# Patient Record
Sex: FEMALE | Race: ASIAN | NOT HISPANIC OR LATINO | Employment: FULL TIME | URBAN - METROPOLITAN AREA
[De-identification: names, ages, dates, MRNs, and addresses within clinical notes are randomized per-mention and may not be internally consistent; named-entity substitution may affect disease eponyms.]

---

## 2017-03-04 ASSESSMENT — MIFFLIN-ST. JEOR: SCORE: 1187.02

## 2017-03-05 ENCOUNTER — SURGERY - HEALTHEAST (OUTPATIENT)
Dept: SURGERY | Facility: HOSPITAL | Age: 42
End: 2017-03-05

## 2017-03-05 ENCOUNTER — ANESTHESIA - HEALTHEAST (OUTPATIENT)
Dept: SURGERY | Facility: HOSPITAL | Age: 42
End: 2017-03-05

## 2017-03-07 ASSESSMENT — MIFFLIN-ST. JEOR: SCORE: 1305.86

## 2017-03-09 ASSESSMENT — MIFFLIN-ST. JEOR: SCORE: 1312.67

## 2017-03-10 ASSESSMENT — MIFFLIN-ST. JEOR: SCORE: 1282.28

## 2017-03-11 ASSESSMENT — MIFFLIN-ST. JEOR: SCORE: 1267.31

## 2017-03-13 ENCOUNTER — HOME CARE/HOSPICE - HEALTHEAST (OUTPATIENT)
Dept: HOME HEALTH SERVICES | Facility: HOME HEALTH | Age: 42
End: 2017-03-13

## 2017-03-13 ASSESSMENT — MIFFLIN-ST. JEOR
SCORE: 1234.65
SCORE: 1245.99

## 2017-03-17 ENCOUNTER — HOME CARE/HOSPICE - HEALTHEAST (OUTPATIENT)
Dept: HOME HEALTH SERVICES | Facility: HOME HEALTH | Age: 42
End: 2017-03-17

## 2017-03-18 ENCOUNTER — HOME CARE/HOSPICE - HEALTHEAST (OUTPATIENT)
Dept: HOME HEALTH SERVICES | Facility: HOME HEALTH | Age: 42
End: 2017-03-18

## 2017-03-19 ENCOUNTER — HOME CARE/HOSPICE - HEALTHEAST (OUTPATIENT)
Dept: HOME HEALTH SERVICES | Facility: HOME HEALTH | Age: 42
End: 2017-03-19

## 2017-03-21 ENCOUNTER — HOME CARE/HOSPICE - HEALTHEAST (OUTPATIENT)
Dept: HOME HEALTH SERVICES | Facility: HOME HEALTH | Age: 42
End: 2017-03-21

## 2017-03-22 ENCOUNTER — HOSPITAL ENCOUNTER (OUTPATIENT)
Dept: INTERVENTIONAL RADIOLOGY/VASCULAR | Facility: HOSPITAL | Age: 42
Discharge: HOME | End: 2017-03-22
Attending: RADIOLOGY

## 2017-03-22 ENCOUNTER — HOSPITAL ENCOUNTER (OUTPATIENT)
Dept: CT IMAGING | Facility: HOSPITAL | Age: 42
Discharge: HOME | End: 2017-03-22

## 2017-03-22 DIAGNOSIS — L02.91 ABSCESS: ICD-10-CM

## 2017-03-22 ASSESSMENT — MIFFLIN-ST. JEOR: SCORE: 1258.24

## 2017-03-23 ENCOUNTER — COMMUNICATION - HEALTHEAST (OUTPATIENT)
Dept: INTERVENTIONAL RADIOLOGY/VASCULAR | Facility: HOSPITAL | Age: 42
End: 2017-03-23

## 2017-03-23 ENCOUNTER — HOME CARE/HOSPICE - HEALTHEAST (OUTPATIENT)
Dept: HOME HEALTH SERVICES | Facility: HOME HEALTH | Age: 42
End: 2017-03-23

## 2017-03-24 ENCOUNTER — COMMUNICATION - HEALTHEAST (OUTPATIENT)
Dept: SURGERY | Facility: CLINIC | Age: 42
End: 2017-03-24

## 2017-03-24 ENCOUNTER — OFFICE VISIT - HEALTHEAST (OUTPATIENT)
Dept: SURGERY | Facility: CLINIC | Age: 42
End: 2017-03-24

## 2017-03-24 DIAGNOSIS — Z48.89 POSTOPERATIVE VISIT: ICD-10-CM

## 2017-03-25 ENCOUNTER — HOME CARE/HOSPICE - HEALTHEAST (OUTPATIENT)
Dept: HOME HEALTH SERVICES | Facility: HOME HEALTH | Age: 42
End: 2017-03-25

## 2017-03-26 ENCOUNTER — HOME CARE/HOSPICE - HEALTHEAST (OUTPATIENT)
Dept: HOME HEALTH SERVICES | Facility: HOME HEALTH | Age: 42
End: 2017-03-26

## 2017-03-28 ENCOUNTER — HOME CARE/HOSPICE - HEALTHEAST (OUTPATIENT)
Dept: HOME HEALTH SERVICES | Facility: HOME HEALTH | Age: 42
End: 2017-03-28

## 2017-03-29 ENCOUNTER — HOME CARE/HOSPICE - HEALTHEAST (OUTPATIENT)
Dept: HOME HEALTH SERVICES | Facility: HOME HEALTH | Age: 42
End: 2017-03-29

## 2017-03-31 ENCOUNTER — HOSPITAL ENCOUNTER (OUTPATIENT)
Dept: CT IMAGING | Facility: HOSPITAL | Age: 42
Discharge: HOME | End: 2017-03-31
Attending: NURSE PRACTITIONER

## 2017-03-31 ENCOUNTER — OFFICE VISIT - HEALTHEAST (OUTPATIENT)
Dept: SURGERY | Facility: CLINIC | Age: 42
End: 2017-03-31

## 2017-03-31 ENCOUNTER — HOSPITAL ENCOUNTER (OUTPATIENT)
Dept: INTERVENTIONAL RADIOLOGY/VASCULAR | Facility: HOSPITAL | Age: 42
Discharge: HOME | End: 2017-03-31
Attending: NURSE PRACTITIONER

## 2017-03-31 ENCOUNTER — COMMUNICATION - HEALTHEAST (OUTPATIENT)
Dept: SURGERY | Facility: CLINIC | Age: 42
End: 2017-03-31

## 2017-03-31 DIAGNOSIS — L02.91 ABSCESS: ICD-10-CM

## 2017-03-31 DIAGNOSIS — Z48.89 POSTOPERATIVE VISIT: ICD-10-CM

## 2017-03-31 ASSESSMENT — MIFFLIN-ST. JEOR: SCORE: 1178.86

## 2017-04-03 ENCOUNTER — COMMUNICATION - HEALTHEAST (OUTPATIENT)
Dept: INTERVENTIONAL RADIOLOGY/VASCULAR | Facility: HOSPITAL | Age: 42
End: 2017-04-03

## 2017-04-03 ENCOUNTER — HOME CARE/HOSPICE - HEALTHEAST (OUTPATIENT)
Dept: HOME HEALTH SERVICES | Facility: HOME HEALTH | Age: 42
End: 2017-04-03

## 2017-04-04 ENCOUNTER — HOME CARE/HOSPICE - HEALTHEAST (OUTPATIENT)
Dept: HOME HEALTH SERVICES | Facility: HOME HEALTH | Age: 42
End: 2017-04-04

## 2017-08-18 RX ORDER — FERROUS SULFATE 325(65) MG
325 TABLET ORAL
COMMUNITY

## 2017-08-25 ENCOUNTER — ANESTHESIA EVENT (OUTPATIENT)
Dept: SURGERY | Facility: CLINIC | Age: 42
End: 2017-08-25
Payer: COMMERCIAL

## 2017-08-25 ENCOUNTER — ANESTHESIA (OUTPATIENT)
Dept: SURGERY | Facility: CLINIC | Age: 42
End: 2017-08-25
Payer: COMMERCIAL

## 2017-08-25 ENCOUNTER — HOSPITAL ENCOUNTER (OUTPATIENT)
Dept: ULTRASOUND IMAGING | Facility: CLINIC | Age: 42
End: 2017-08-25
Attending: OBSTETRICS & GYNECOLOGY | Admitting: OBSTETRICS & GYNECOLOGY
Payer: COMMERCIAL

## 2017-08-25 ENCOUNTER — HOSPITAL ENCOUNTER (OUTPATIENT)
Facility: CLINIC | Age: 42
Discharge: HOME | End: 2017-08-25
Attending: OBSTETRICS & GYNECOLOGY | Admitting: OBSTETRICS & GYNECOLOGY
Payer: COMMERCIAL

## 2017-08-25 VITALS
HEIGHT: 60 IN | BODY MASS INDEX: 27.29 KG/M2 | HEART RATE: 86 BPM | WEIGHT: 139 LBS | OXYGEN SATURATION: 99 % | RESPIRATION RATE: 18 BRPM | TEMPERATURE: 97.4 F | SYSTOLIC BLOOD PRESSURE: 162 MMHG | DIASTOLIC BLOOD PRESSURE: 94 MMHG

## 2017-08-25 DIAGNOSIS — Z98.890 S/P D&C (STATUS POST DILATION AND CURETTAGE): Primary | ICD-10-CM

## 2017-08-25 DIAGNOSIS — N92.0 MENORRHAGIA WITH REGULAR CYCLE: ICD-10-CM

## 2017-08-25 LAB
CREAT SERPL-MCNC: 0.51 MG/DL (ref 0.52–1.04)
GFR SERPL CREATININE-BSD FRML MDRD: >90 ML/MIN/1.7M2
GLUCOSE BLDC GLUCOMTR-MCNC: 250 MG/DL (ref 70–99)
GLUCOSE BLDC GLUCOMTR-MCNC: 264 MG/DL (ref 70–99)
HCG UR QL: NEGATIVE
POTASSIUM SERPL-SCNC: 4.3 MMOL/L (ref 3.4–5.3)

## 2017-08-25 PROCEDURE — 25000128 H RX IP 250 OP 636: Performed by: OBSTETRICS & GYNECOLOGY

## 2017-08-25 PROCEDURE — 36415 COLL VENOUS BLD VENIPUNCTURE: CPT | Performed by: ANESTHESIOLOGY

## 2017-08-25 PROCEDURE — 40000864 US INTRAOPERATIVE

## 2017-08-25 PROCEDURE — 40000306 ZZH STATISTIC PRE PROC ASSESS II: Performed by: OBSTETRICS & GYNECOLOGY

## 2017-08-25 PROCEDURE — 37000009 ZZH ANESTHESIA TECHNICAL FEE, EACH ADDTL 15 MIN: Performed by: OBSTETRICS & GYNECOLOGY

## 2017-08-25 PROCEDURE — 88305 TISSUE EXAM BY PATHOLOGIST: CPT | Mod: 26 | Performed by: OBSTETRICS & GYNECOLOGY

## 2017-08-25 PROCEDURE — 71000014 ZZH RECOVERY PHASE 1 LEVEL 2 FIRST HR: Performed by: OBSTETRICS & GYNECOLOGY

## 2017-08-25 PROCEDURE — 27210794 ZZH OR GENERAL SUPPLY STERILE: Performed by: OBSTETRICS & GYNECOLOGY

## 2017-08-25 PROCEDURE — 36000056 ZZH SURGERY LEVEL 3 1ST 30 MIN: Performed by: OBSTETRICS & GYNECOLOGY

## 2017-08-25 PROCEDURE — 25000128 H RX IP 250 OP 636: Performed by: ANESTHESIOLOGY

## 2017-08-25 PROCEDURE — 82962 GLUCOSE BLOOD TEST: CPT

## 2017-08-25 PROCEDURE — 25000566 ZZH SEVOFLURANE, EA 15 MIN: Performed by: OBSTETRICS & GYNECOLOGY

## 2017-08-25 PROCEDURE — 81025 URINE PREGNANCY TEST: CPT | Performed by: ANESTHESIOLOGY

## 2017-08-25 PROCEDURE — 84132 ASSAY OF SERUM POTASSIUM: CPT | Performed by: ANESTHESIOLOGY

## 2017-08-25 PROCEDURE — 82565 ASSAY OF CREATININE: CPT | Performed by: ANESTHESIOLOGY

## 2017-08-25 PROCEDURE — 25000132 ZZH RX MED GY IP 250 OP 250 PS 637: Performed by: OBSTETRICS & GYNECOLOGY

## 2017-08-25 PROCEDURE — 37000008 ZZH ANESTHESIA TECHNICAL FEE, 1ST 30 MIN: Performed by: OBSTETRICS & GYNECOLOGY

## 2017-08-25 PROCEDURE — 71000027 ZZH RECOVERY PHASE 2 EACH 15 MINS: Performed by: OBSTETRICS & GYNECOLOGY

## 2017-08-25 PROCEDURE — 25000125 ZZHC RX 250: Performed by: OBSTETRICS & GYNECOLOGY

## 2017-08-25 PROCEDURE — 88305 TISSUE EXAM BY PATHOLOGIST: CPT | Performed by: OBSTETRICS & GYNECOLOGY

## 2017-08-25 PROCEDURE — 25000125 ZZHC RX 250: Performed by: NURSE ANESTHETIST, CERTIFIED REGISTERED

## 2017-08-25 PROCEDURE — 25000128 H RX IP 250 OP 636: Performed by: NURSE ANESTHETIST, CERTIFIED REGISTERED

## 2017-08-25 RX ORDER — SODIUM CHLORIDE, SODIUM LACTATE, POTASSIUM CHLORIDE, CALCIUM CHLORIDE 600; 310; 30; 20 MG/100ML; MG/100ML; MG/100ML; MG/100ML
INJECTION, SOLUTION INTRAVENOUS CONTINUOUS
Status: DISCONTINUED | OUTPATIENT
Start: 2017-08-25 | End: 2017-08-25 | Stop reason: HOSPADM

## 2017-08-25 RX ORDER — PROPOFOL 10 MG/ML
INJECTION, EMULSION INTRAVENOUS PRN
Status: DISCONTINUED | OUTPATIENT
Start: 2017-08-25 | End: 2017-08-25

## 2017-08-25 RX ORDER — FENTANYL CITRATE 50 UG/ML
INJECTION, SOLUTION INTRAMUSCULAR; INTRAVENOUS PRN
Status: DISCONTINUED | OUTPATIENT
Start: 2017-08-25 | End: 2017-08-25

## 2017-08-25 RX ORDER — MEPERIDINE HYDROCHLORIDE 25 MG/ML
12.5 INJECTION INTRAMUSCULAR; INTRAVENOUS; SUBCUTANEOUS
Status: DISCONTINUED | OUTPATIENT
Start: 2017-08-25 | End: 2017-08-25 | Stop reason: HOSPADM

## 2017-08-25 RX ORDER — GLYCOPYRROLATE 0.2 MG/ML
INJECTION, SOLUTION INTRAMUSCULAR; INTRAVENOUS PRN
Status: DISCONTINUED | OUTPATIENT
Start: 2017-08-25 | End: 2017-08-25

## 2017-08-25 RX ORDER — LIDOCAINE 40 MG/G
CREAM TOPICAL
Status: DISCONTINUED | OUTPATIENT
Start: 2017-08-25 | End: 2017-08-25 | Stop reason: HOSPADM

## 2017-08-25 RX ORDER — OXYCODONE HCL 5 MG/5 ML
5 SOLUTION, ORAL ORAL EVERY 4 HOURS PRN
Status: DISCONTINUED | OUTPATIENT
Start: 2017-08-25 | End: 2017-08-25 | Stop reason: HOSPADM

## 2017-08-25 RX ORDER — ALBUTEROL SULFATE 0.83 MG/ML
2.5 SOLUTION RESPIRATORY (INHALATION) EVERY 4 HOURS PRN
Status: DISCONTINUED | OUTPATIENT
Start: 2017-08-25 | End: 2017-08-25 | Stop reason: HOSPADM

## 2017-08-25 RX ORDER — FENTANYL CITRATE 50 UG/ML
25-50 INJECTION, SOLUTION INTRAMUSCULAR; INTRAVENOUS
Status: DISCONTINUED | OUTPATIENT
Start: 2017-08-25 | End: 2017-08-25 | Stop reason: HOSPADM

## 2017-08-25 RX ORDER — ONDANSETRON 2 MG/ML
INJECTION INTRAMUSCULAR; INTRAVENOUS PRN
Status: DISCONTINUED | OUTPATIENT
Start: 2017-08-25 | End: 2017-08-25

## 2017-08-25 RX ORDER — PROMETHAZINE HYDROCHLORIDE 25 MG/ML
6.25 INJECTION, SOLUTION INTRAMUSCULAR; INTRAVENOUS
Status: DISCONTINUED | OUTPATIENT
Start: 2017-08-25 | End: 2017-08-25 | Stop reason: HOSPADM

## 2017-08-25 RX ORDER — CEFAZOLIN SODIUM 2 G/100ML
2 INJECTION, SOLUTION INTRAVENOUS
Status: COMPLETED | OUTPATIENT
Start: 2017-08-25 | End: 2017-08-25

## 2017-08-25 RX ORDER — METOPROLOL TARTRATE 1 MG/ML
1-2 INJECTION, SOLUTION INTRAVENOUS EVERY 5 MIN PRN
Status: DISCONTINUED | OUTPATIENT
Start: 2017-08-25 | End: 2017-08-25 | Stop reason: HOSPADM

## 2017-08-25 RX ORDER — ONDANSETRON 2 MG/ML
4 INJECTION INTRAMUSCULAR; INTRAVENOUS EVERY 30 MIN PRN
Status: DISCONTINUED | OUTPATIENT
Start: 2017-08-25 | End: 2017-08-25 | Stop reason: HOSPADM

## 2017-08-25 RX ORDER — PROPOFOL 10 MG/ML
INJECTION, EMULSION INTRAVENOUS CONTINUOUS PRN
Status: DISCONTINUED | OUTPATIENT
Start: 2017-08-25 | End: 2017-08-25

## 2017-08-25 RX ORDER — ONDANSETRON 4 MG/1
4 TABLET, ORALLY DISINTEGRATING ORAL EVERY 30 MIN PRN
Status: DISCONTINUED | OUTPATIENT
Start: 2017-08-25 | End: 2017-08-25 | Stop reason: HOSPADM

## 2017-08-25 RX ORDER — NALOXONE HYDROCHLORIDE 0.4 MG/ML
.1-.4 INJECTION, SOLUTION INTRAMUSCULAR; INTRAVENOUS; SUBCUTANEOUS
Status: DISCONTINUED | OUTPATIENT
Start: 2017-08-25 | End: 2017-08-25 | Stop reason: HOSPADM

## 2017-08-25 RX ORDER — OXYCODONE HCL 5 MG/5 ML
5 SOLUTION, ORAL ORAL EVERY 4 HOURS PRN
Qty: 60 ML | Refills: 0 | Status: SHIPPED | OUTPATIENT
Start: 2017-08-25

## 2017-08-25 RX ORDER — KETOROLAC TROMETHAMINE 30 MG/ML
30 INJECTION, SOLUTION INTRAMUSCULAR; INTRAVENOUS ONCE
Status: COMPLETED | OUTPATIENT
Start: 2017-08-25 | End: 2017-08-25

## 2017-08-25 RX ADMIN — PROPOFOL 75 MCG/KG/MIN: 10 INJECTION, EMULSION INTRAVENOUS at 08:09

## 2017-08-25 RX ADMIN — GLYCOPYRROLATE 0.2 MG: 0.2 INJECTION, SOLUTION INTRAMUSCULAR; INTRAVENOUS at 08:05

## 2017-08-25 RX ADMIN — SODIUM CHLORIDE, POTASSIUM CHLORIDE, SODIUM LACTATE AND CALCIUM CHLORIDE: 600; 310; 30; 20 INJECTION, SOLUTION INTRAVENOUS at 08:01

## 2017-08-25 RX ADMIN — FENTANYL CITRATE 100 MCG: 50 INJECTION, SOLUTION INTRAMUSCULAR; INTRAVENOUS at 08:05

## 2017-08-25 RX ADMIN — ACETAMINOPHEN 325 MG: 325 SOLUTION ORAL at 10:05

## 2017-08-25 RX ADMIN — ONDANSETRON 4 MG: 2 INJECTION INTRAMUSCULAR; INTRAVENOUS at 08:21

## 2017-08-25 RX ADMIN — OXYCODONE HYDROCHLORIDE 5 MG: 5 SOLUTION ORAL at 10:05

## 2017-08-25 RX ADMIN — MIDAZOLAM HYDROCHLORIDE 2 MG: 1 INJECTION, SOLUTION INTRAMUSCULAR; INTRAVENOUS at 08:01

## 2017-08-25 RX ADMIN — PROPOFOL 150 MG: 10 INJECTION, EMULSION INTRAVENOUS at 08:05

## 2017-08-25 RX ADMIN — CEFAZOLIN SODIUM 2 G: 2 INJECTION, SOLUTION INTRAVENOUS at 08:01

## 2017-08-25 RX ADMIN — KETOROLAC TROMETHAMINE 30 MG: 30 INJECTION, SOLUTION INTRAMUSCULAR at 07:59

## 2017-08-25 NOTE — OP NOTE
DATE OF SURGERY:  2017.      SURGEON:  Jesus Mcdonald MD      ASSISTANTS:  None.      PREOPERATIVE DIAGNOSES:  Menorrhagia; thickened endometrium on ultrasound examination.      POSTOPERATIVE DIAGNOSES:  Menorrhagia; thickened endometrium on ultrasound examination.      NAME OF PROCEDURE:  Dilation and curettage with ultrasound guidance.      INDICATIONS FOR SURGERY:  The patient is a 42-year-old  female,  10, para 3346, with recent LMP who is scheduled for D&C for evaluation of menorrhagia.  Ultrasound evaluation showed evidence of a thickened endometrium and patient has had an irregular menstrual pattern.  Initial evaluation was performed in 2017.  The patient also had evidence of an elevated sedimentation rate, so the patient was treated with a course of doxycycline and followup laboratory before pursuing the D&C.  The patient also has insulin-requiring diabetes mellitus and followup with her PCP was arranged.  The potential risks and complications of the surgery were reviewed with the patient.  Written consent was obtained.      OPERATIVE FINDINGS:   1.  Normal examination under anesthesia.  There is evidence for a midline cystocele and marked uterine descensus with retractor.   2.  The uterus is slightly enlarged to bimanual examination.  The uterus sounds to 8 cm.  The remainder of the exam under anesthesia was unremarkable.   3.  The endometrium was uniformly thin to transabdominal ultrasound examination following the procedure.      DESCRIPTION OF PROCEDURE:  After obtaining adequate general anesthesia, the patient was placed in the dorsal lithotomy position and the perineal and vaginal fields prepped and draped in the usual sterile manner.  Transabdominal ultrasound showed the bladder to be sufficiently filled to allow for good visualization.  A tenaculum was placed on the anterior lip of the cervix and endocervical curettage performed.  Specimen was submitted to Pathology.  With  ultrasound guidance, the uterus was sounded as noted.  The uterine cervix was then dilated until a #8 Hegar passed easily.  The endometrial cavity was then subjected to sharp curettage and the cornual layer was explored with Monico Stone polyp forceps.  Again, this portion of the procedure was done with ultrasound guidance.  Instruments were then removed from the vagina.  Bleeding at the tenaculum site was controlled with topical application of silver nitrate.  The bladder was straight catheterized for 150 mL of clear urine.  Estimated blood loss was 15 mL.  Final sponge counts were correct.  The patient was recalled from anesthesia without difficulty and left the OR in stable condition.         NEETA MCDONALD MD             D: 2017 08:40   T: 2017 12:16   MT: LESLIE      Name:     GAUTAM KIM   MRN:      [id number]        Account:        [id number]   :      1975           Procedure Date: 2017      Document: [id number]       cc: Neeta Mcdonald MD

## 2017-08-25 NOTE — PROGRESS NOTES
Discharge instructions given and patient and son verbalized understanding.  Questions answered.  Patient did void.  Discharged in stable condition, pain at goal 3/10.

## 2017-08-25 NOTE — IP AVS SNAPSHOT
[facility] PreOP/PostOP    [address]    [facility] [address]    Phone:  [phone]    Fax:  [phone]                                       After Visit Summary   8/25/2017    Neptali Olivarez    MRN: [id number]           After Visit Summary Signature Page     I have received my discharge instructions, and my questions have been answered. I have discussed any challenges I see with this plan with the nurse or doctor.    ..........................................................................................................................................  Patient/Patient Representative Signature      ..........................................................................................................................................  Patient Representative Print Name and Relationship to Patient    ..................................................               ................................................  Date                                            Time    ..........................................................................................................................................  Reviewed by Signature/Title    ...................................................              ..............................................  Date                                                            Time

## 2017-08-25 NOTE — ANESTHESIA POSTPROCEDURE EVALUATION
Patient: Neptali Olivarez    Procedure(s):  DILATION AND CURETTAGE, WITH ULTRASOUND GUIDANCE  - Wound Class: II-Clean Contaminated    Diagnosis:Menorrhagia, Thickened Endometrium   Diagnosis Additional Information: Menorrhagia, Thickened Endometrium      Anesthesia Type:  General    Note:  Anesthesia Post Evaluation    Patient location during evaluation: PACU  Patient participation: Able to fully participate in evaluation  Level of consciousness: awake  Pain management: adequate  Airway patency: patent  Cardiovascular status: acceptable  Respiratory status: acceptable  Hydration status: acceptable  PONV: controlled     Anesthetic complications: None          Last vitals:  Vitals:    08/25/17 0850 08/25/17 0855 08/25/17 0906   BP: (!) 161/97 (!) 158/95 (!) 158/95   Pulse:      Resp: 16 18 20   Temp:   98.4  F (36.9  C)   SpO2: 100% 100%          Electronically Signed By: Phil Esquivel DO  August 25, 2017  9:16 AM

## 2017-08-25 NOTE — DISCHARGE INSTRUCTIONS
GENERAL ANESTHESIA OR SEDATION ADULT DISCHARGE INSTRUCTIONS   SPECIAL PRECAUTIONS FOR 24 HOURS AFTER SURGERY    IT IS NOT UNUSUAL TO FEEL LIGHT-HEADED OR FAINT, UP TO 24 HOURS AFTER SURGERY OR WHILE TAKING PAIN MEDICATION.  IF YOU HAVE THESE SYMPTOMS; SIT FOR A FEW MINUTES BEFORE STANDING AND HAVE SOMEONE ASSIST YOU WHEN YOU GET UP TO WALK OR USE THE BATHROOM.    YOU SHOULD REST AND RELAX FOR THE NEXT 24 HOURS AND YOU MUST MAKE ARRANGEMENTS TO HAVE SOMEONE STAY WITH YOU FOR AT LEAST 24 HOURS AFTER YOUR DISCHARGE.  AVOID HAZARDOUS AND STRENUOUS ACTIVITIES.  DO NOT MAKE IMPORTANT DECISIONS FOR 24 HOURS.    DO NOT DRIVE ANY VEHICLE OR OPERATE MECHANICAL EQUIPMENT FOR 24 HOURS FOLLOWING THE END OF YOUR SURGERY.  EVEN THOUGH YOU MAY FEEL NORMAL, YOUR REACTIONS MAY BE AFFECTED BY THE MEDICATION YOU HAVE RECEIVED.    DO NOT DRINK ALCOHOLIC BEVERAGES FOR 24 HOURS FOLLOWING YOUR SURGERY.    DRINK CLEAR LIQUIDS (APPLE JUICE, GINGER ALE, 7-UP, BROTH, ETC.).  PROGRESS TO YOUR REGULAR DIET AS YOU FEEL ABLE.    YOU MAY HAVE A DRY MOUTH, A SORE THROAT, MUSCLES ACHES OR TROUBLE SLEEPING.  THESE SHOULD GO AWAY AFTER 24 HOURS.    CALL YOUR DOCTOR FOR ANY OF THE FOLLOWING:  SIGNS OF INFECTION (FEVER, GROWING TENDERNESS AT THE SURGERY SITE, A LARGE AMOUNT OF DRAINAGE OR BLEEDING, SEVERE PAIN, FOUL-SMELLING DRAINAGE, REDNESS OR SWELLING.    IT HAS BEEN OVER 8 TO 10 HOURS SINCE SURGERY AND YOU ARE STILL NOT ABLE TO URINATE (PASS WATER).     DILATION AND CURETTAGE DISCHARGE INSTRUCTIONS    PLEASE RETURN TO THE CLINIC IN:  ____1 WEEK  ____2 WEEKS  ____4 WEEKS  ____6 WEEKS  MAKE THIS APPOINTMENT AFTER YOU GET HOME IF IT HAS NOT ALREADY BEEN SCHEDULED.    DO NOT DRIVE A CAR, DRINK ALCOHOL OR USE MACHINERY FOR THE NEXT 24 HOURS.  YOU SHOULD WAIT UNTIL YOU HAVE RECOVERED BEFORE MAKING ANY IMPORTANT DECISIONS.    PAIN AND DISCOMFORT  YOU MAY HAVE CRAMPS OR A LOW BACKACHE FOR 24 TO 48 HOURS.  TYLENOL (ACETAMINOPHEN) OR MOTRIN (IBUPROFEN) MAY  HELP, OR YOUR DOCTOR MAY GIVE YOU PAIN MEDICINE.  CALL YOUR DOCTOR IF PAIN CANNOT BE CONTROLLED.  YOU MAY FEEL DROWSY AND WEAK FOR A DAY OR TWO.    VAGINAL DISCHARGE  YOU MAY HAVE SOME BLEEDING OR DISCHARGE FOR UP TO TWO WEEKS.  DO NOT DOUCHE, USE TAMPONS OR HAVE SEX (INTERCOURSE) IN THE FIRST WEEK.  CALL YOUR DOCTOR IF YOU SOAK MORE THAN ONE MAXI PAD (SANITARY NAPKIN) PER HOUR, OR IF YOU PASS LARGE BLOOD CLOTS.    OTHER SYMPTOMS  YOU MAY HAVE A LOW FEVER FOR THE FIRST TWO DAYS.  CALL YOUR DOCTOR IF YOUR FEVER GOES OVER 101 DEGREES FAHRENHEIT.    IF YOU HAVE NAUSEA (FEEL SICK TO YOUR STOMACH), STAY IN BED.  TRY DRINKING A SMALL AMOUNT 7-UP, TEA OR SOUP.    DIET AND ACTIVITY  EAT LIGHT MEALS AND DRINK PLENTY OF FLUIDS FOR THE FIRST 24 HOURS (OR LONGER, IF YOU HAVE NAUSEA).    YOU MAY BATHE, SHOWER AND CLIMB STAIRS.  MOST WOMEN CAN RETURN TO WORK AFTER 24 HOURS.  YOU MAY GO BACK TO YOUR OTHER ACTIVITIES AFTER YOUR PAIN GOES AWAY.        DR. NEETA TIDWELL M.D.   CLINIC PHONE NUMBER:[phone].    You received Toradol, an IV form of ibuprofen (Motrin) at 8:00am.  Do not take any ibuprofen products until 2:00 pm.

## 2017-08-25 NOTE — ANESTHESIA PREPROCEDURE EVALUATION
Anesthesia Evaluation     .             ROS/MED HX    ENT/Pulmonary:  - neg pulmonary ROS     Neurologic:  - neg neurologic ROS     Cardiovascular:  - neg cardiovascular ROS       METS/Exercise Tolerance:     Hematologic:  - neg hematologic  ROS       Musculoskeletal:  - neg musculoskeletal ROS       GI/Hepatic:  - neg GI/hepatic ROS       Renal/Genitourinary:  - ROS Renal section negative       Endo:     (+) type I DM, .      Psychiatric:  - neg psychiatric ROS       Infectious Disease:  - neg infectious disease ROS       Malignancy:         Other:    - neg other ROS                 Physical Exam  Normal systems: cardiovascular and pulmonary    Airway   Mallampati: II    Dental     Cardiovascular   Rhythm and rate: regular and normal      Pulmonary    breath sounds clear to auscultation                    Anesthesia Plan      History & Physical Review  History and physical reviewed and following examination; no interval change.    ASA Status:  2 .        Plan for General with Intravenous induction. Maintenance will be Inhalation and Balanced.           Postoperative Care      Consents  Anesthetic plan, risks, benefits and alternatives discussed with:  Patient or representative..                          .

## 2017-08-25 NOTE — IP AVS SNAPSHOT
MRN:[id number]                      After Visit Summary   8/25/2017    Neptali Olivarez    MRN: [id number]           Thank you!     Thank you for choosing [facility] for your care. Our goal is always to provide you with excellent care. Hearing back from our patients is one way we can continue to improve our services. Please take a few minutes to complete the written survey that you may receive in the mail after you visit. If you would like to speak to someone directly about your visit please contact Patient Relations at [phone]. Thank you!          Patient Information     Date Of Birth          1975        About your hospital stay     You were admitted on:  August 25, 2017 You last received care in the:  [facility] PreOP/PostOP    You were discharged on:  August 25, 2017       Who to Call     For medical emergencies, please call 911.  For non-urgent questions about your medical care, please call your primary care provider or clinic, [phone]  For questions related to your surgery, please call your surgery clinic        Attending Provider     Provider Specialty    Jesus Mcdonald MD OB/Gyn       Primary Care Provider Office Phone # Fax #    Artur Holliday PA-C [phone]      After Care Instructions     Discharge Instructions       Resume pre procedure diet            Discharge Instructions       Pelvic Rest. No tampons, douching or intercourse for  1  week.            Discharge Instructions       Patient may return to work POD  2            Discharge Instructions       Patient may drive beginning POD 1 if not taking narcotic pain medication            Discharge Instructions       Patient to arrange follow up appointment in 6  weeks            No alcohol       NO ALCOHOL for 24 hours post procedure            No driving or operating machinery       No driving or operating machinery until day after procedure            Shower        Shower on Post-op  day  1.   DO NOT take a bath                  Further instructions from your care team       GENERAL ANESTHESIA OR SEDATION ADULT DISCHARGE INSTRUCTIONS   SPECIAL PRECAUTIONS FOR 24 HOURS AFTER SURGERY    IT IS NOT UNUSUAL TO FEEL LIGHT-HEADED OR FAINT, UP TO 24 HOURS AFTER SURGERY OR WHILE TAKING PAIN MEDICATION.  IF YOU HAVE THESE SYMPTOMS; SIT FOR A FEW MINUTES BEFORE STANDING AND HAVE SOMEONE ASSIST YOU WHEN YOU GET UP TO WALK OR USE THE BATHROOM.    YOU SHOULD REST AND RELAX FOR THE NEXT 24 HOURS AND YOU MUST MAKE ARRANGEMENTS TO HAVE SOMEONE STAY WITH YOU FOR AT LEAST 24 HOURS AFTER YOUR DISCHARGE.  AVOID HAZARDOUS AND STRENUOUS ACTIVITIES.  DO NOT MAKE IMPORTANT DECISIONS FOR 24 HOURS.    DO NOT DRIVE ANY VEHICLE OR OPERATE MECHANICAL EQUIPMENT FOR 24 HOURS FOLLOWING THE END OF YOUR SURGERY.  EVEN THOUGH YOU MAY FEEL NORMAL, YOUR REACTIONS MAY BE AFFECTED BY THE MEDICATION YOU HAVE RECEIVED.    DO NOT DRINK ALCOHOLIC BEVERAGES FOR 24 HOURS FOLLOWING YOUR SURGERY.    DRINK CLEAR LIQUIDS (APPLE JUICE, GINGER ALE, 7-UP, BROTH, ETC.).  PROGRESS TO YOUR REGULAR DIET AS YOU FEEL ABLE.    YOU MAY HAVE A DRY MOUTH, A SORE THROAT, MUSCLES ACHES OR TROUBLE SLEEPING.  THESE SHOULD GO AWAY AFTER 24 HOURS.    CALL YOUR DOCTOR FOR ANY OF THE FOLLOWING:  SIGNS OF INFECTION (FEVER, GROWING TENDERNESS AT THE SURGERY SITE, A LARGE AMOUNT OF DRAINAGE OR BLEEDING, SEVERE PAIN, FOUL-SMELLING DRAINAGE, REDNESS OR SWELLING.    IT HAS BEEN OVER 8 TO 10 HOURS SINCE SURGERY AND YOU ARE STILL NOT ABLE TO URINATE (PASS WATER).     DILATION AND CURETTAGE DISCHARGE INSTRUCTIONS    PLEASE RETURN TO THE CLINIC IN:  ____1 WEEK  ____2 WEEKS  ____4 WEEKS  ____6 WEEKS  MAKE THIS APPOINTMENT AFTER YOU GET HOME IF IT HAS NOT ALREADY BEEN SCHEDULED.    DO NOT DRIVE A CAR, DRINK ALCOHOL OR USE MACHINERY FOR THE NEXT 24 HOURS.  YOU SHOULD WAIT UNTIL YOU HAVE RECOVERED BEFORE MAKING ANY IMPORTANT DECISIONS.    PAIN AND DISCOMFORT  YOU MAY HAVE CRAMPS  OR A LOW BACKACHE FOR 24 TO 48 HOURS.  TYLENOL (ACETAMINOPHEN) OR MOTRIN (IBUPROFEN) MAY HELP, OR YOUR DOCTOR MAY GIVE YOU PAIN MEDICINE.  CALL YOUR DOCTOR IF PAIN CANNOT BE CONTROLLED.  YOU MAY FEEL DROWSY AND WEAK FOR A DAY OR TWO.    VAGINAL DISCHARGE  YOU MAY HAVE SOME BLEEDING OR DISCHARGE FOR UP TO TWO WEEKS.  DO NOT DOUCHE, USE TAMPONS OR HAVE SEX (INTERCOURSE) IN THE FIRST WEEK.  CALL YOUR DOCTOR IF YOU SOAK MORE THAN ONE MAXI PAD (SANITARY NAPKIN) PER HOUR, OR IF YOU PASS LARGE BLOOD CLOTS.    OTHER SYMPTOMS  YOU MAY HAVE A LOW FEVER FOR THE FIRST TWO DAYS.  CALL YOUR DOCTOR IF YOUR FEVER GOES OVER 101 DEGREES FAHRENHEIT.    IF YOU HAVE NAUSEA (FEEL SICK TO YOUR STOMACH), STAY IN BED.  TRY DRINKING A SMALL AMOUNT 7-UP, TEA OR SOUP.    DIET AND ACTIVITY  EAT LIGHT MEALS AND DRINK PLENTY OF FLUIDS FOR THE FIRST 24 HOURS (OR LONGER, IF YOU HAVE NAUSEA).    YOU MAY BATHE, SHOWER AND CLIMB STAIRS.  MOST WOMEN CAN RETURN TO WORK AFTER 24 HOURS.  YOU MAY GO BACK TO YOUR OTHER ACTIVITIES AFTER YOUR PAIN GOES AWAY.        DR. NEETA MCDONALD M.D.   CLINIC PHONE NUMBER:[phone].    You received Toradol, an IV form of ibuprofen (Motrin) at 8:00am.  Do not take any ibuprofen products until 2:00 pm.    Pending Results     Date and Time Order Name Status Description    8/25/2017 0820 Surgical pathology exam In process             Admission Information     Date & Time Provider Department Dept. Phone    8/25/2017 Neeta Mcdonald MD [facility] PreOP/PostOP [phone]      Your Vitals Were     Blood Pressure Pulse Temperature Respirations Height Weight    174/100 80 97  F (36.1  C) (Temporal) 20 1.524 m (5') 63 kg (139 lb)    Last Period Pulse Oximetry BMI (Body Mass Index)             08/03/2017 100% 27.15 kg/m2         MyChart Information     [facility] lets you send messages to your doctor, view your test results, renew your prescriptions, schedule appointments and more. To sign up, go to  [email or web address] . Click on \"Log in\" on the left side of the screen, which will take you to the Welcome page. Then click on \"Sign up Now\" on the right side of the page.     You will be asked to enter the access code listed below, as well as some personal information. Please follow the directions to create your username and password.     Your access code is: [email or web address]  Expires: 2017  9:58 AM     Your access code will  in 90 days. If you need help or a new code, please call your [place] clinic or [phone].        Care EveryWhere ID     This is your Care EveryWhere ID. This could be used by other organizations to access your [place] medical records  [id number]        Equal Access to Services     JAC RIVERA : Will Mccann, feli dillon, lisa dyer, jaime garner. So [facility] [phone].    ATENCIÓN: Si habla español, tiene a tierney disposición servicios gratuitos de asistencia lingüística. Jesika al [phone].    We comply with applicable federal civil rights laws and [place] laws. We do not discriminate on the basis of race, color, national origin, age, disability sex, sexual orientation or gender identity.               Review of your medicines      START taking        Dose / Directions    oxyCODONE 5 MG/5ML solution   Commonly known as:  ROXICODONE   Used for:  S/P D&C (status post dilation and curettage)        Dose:  5 mg   Take 5 mLs (5 mg) by mouth every 4 hours as needed for moderate to severe pain maximum 30 mL per day   Quantity:  60 mL   Refills:  0         CONTINUE these medicines which have NOT CHANGED        Dose / Directions    ferrous sulfate 325 (65 FE) MG tablet   Commonly known as:  IRON        Dose:  325 mg   Take 325 mg by mouth daily (with breakfast)   Refills:  0       insulin glargine 100 UNIT/ML injection   Commonly known as:  LANTUS        Dose:  35 Units   Inject 35 Units Subcutaneous At Bedtime "   Refills:  0       NOVOLOG SC        Inject Subcutaneous 3 times daily (with meals)   Refills:  0            Where to get your medicines      Some of these will need a paper prescription and others can be bought over the counter. Ask your nurse if you have questions.     Bring a paper prescription for each of these medications     oxyCODONE 5 MG/5ML solution                Protect others around you: Learn how to safely use, store and throw away your medicines at www.disposemymeds.org.             Medication List: This is a list of all your medications and when to take them. Check marks below indicate your daily home schedule. Keep this list as a reference.      Medications           Morning Afternoon Evening Bedtime As Needed    ferrous sulfate 325 (65 FE) MG tablet   Commonly known as:  IRON   Take 325 mg by mouth daily (with breakfast)                                insulin glargine 100 UNIT/ML injection   Commonly known as:  LANTUS   Inject 35 Units Subcutaneous At Bedtime                                NOVOLOG SC   Inject Subcutaneous 3 times daily (with meals)                                oxyCODONE 5 MG/5ML solution   Commonly known as:  ROXICODONE   Take 5 mLs (5 mg) by mouth every 4 hours as needed for moderate to severe pain maximum 30 mL per day

## 2017-08-25 NOTE — ANESTHESIA CARE TRANSFER NOTE
Patient: Neptali Olivarez    Procedure(s):  DILATION AND CURETTAGE, WITH ULTRASOUND GUIDANCE  - Wound Class: II-Clean Contaminated    Diagnosis: Menorrhagia, Thickened Endometrium   Diagnosis Additional Information: No value filed.    Anesthesia Type:   General     Note:  Airway :T-piece and LMA  Patient transferred to:PACU  Comments: To PACU, adequate gas exchange, report to RN.      Vitals: (Last set prior to Anesthesia Care Transfer)    CRNA VITALS  8/25/2017 0800 - 8/25/2017 0835      8/25/2017             EKG: Sinus rhythm                Electronically Signed By: BROOKE Alfaro CRNA  August 25, 2017  8:35 AM

## 2017-08-25 NOTE — BRIEF OP NOTE
[facility] Brief Operative Note    Pre-operative diagnosis: Menorrhagia, Thickened Endometrium    Post-operative diagnosis Same   Procedure: Procedure(s):  DILATION AND CURETTAGE, WITH ULTRASOUND GUIDANCE  - Wound Class: II-Clean Contaminated   Surgeon(s): Surgeon(s) and Role:     * Jesus Mcdonald MD - Primary   Estimated blood loss: 15 mL    Specimens:   ID Type Source Tests Collected by Time Destination   A : Endometrial Curettings  Tissue Endometrium SURGICAL PATHOLOGY EXAM Jesus Mcdonald MD 8/25/2017  7:36 AM    B : Endocervical Curettings  Tissue Endocervical SURGICAL PATHOLOGY EXAM Jesus Mcdonald MD 8/25/2017  7:36 AM       Findings: Uterus sounded to 8 cm  Normal EUA  Endometrium uniformly thin to TA-US exam after curettage

## 2017-08-28 LAB — COPATH REPORT: NORMAL

## 2021-05-26 ENCOUNTER — RECORDS - HEALTHEAST (OUTPATIENT)
Dept: ADMINISTRATIVE | Facility: CLINIC | Age: 46
End: 2021-05-26

## 2021-05-27 ENCOUNTER — RECORDS - HEALTHEAST (OUTPATIENT)
Dept: ADMINISTRATIVE | Facility: CLINIC | Age: 46
End: 2021-05-27

## 2021-05-29 ENCOUNTER — RECORDS - HEALTHEAST (OUTPATIENT)
Dept: ADMINISTRATIVE | Facility: CLINIC | Age: 46
End: 2021-05-29

## 2021-05-30 ENCOUNTER — RECORDS - HEALTHEAST (OUTPATIENT)
Dept: ADMINISTRATIVE | Facility: CLINIC | Age: 46
End: 2021-05-30

## 2021-05-30 VITALS — HEIGHT: 60 IN | WEIGHT: 135 LBS | BODY MASS INDEX: 26.5 KG/M2

## 2021-05-30 VITALS — BODY MASS INDEX: 28.13 KG/M2 | WEIGHT: 149 LBS | HEIGHT: 61 IN

## 2021-05-30 VITALS — BODY MASS INDEX: 29.41 KG/M2 | HEIGHT: 60 IN | WEIGHT: 149.8 LBS

## 2021-06-02 ENCOUNTER — RECORDS - HEALTHEAST (OUTPATIENT)
Dept: ADMINISTRATIVE | Facility: CLINIC | Age: 46
End: 2021-06-02

## 2021-06-09 NOTE — ANESTHESIA POSTPROCEDURE EVALUATION
Patient: Neptali Olivarez  APPENDECTOMY, LAPAROSCOPIC  Anesthesia type: general    Patient location: PACU  Last vitals:   Vitals:    03/05/17 0840   BP: 113/62   Pulse: (!) 108   Resp: 18   Temp: 37.6  C (99.6  F)   SpO2: 95%     Post vital signs: stable  Level of consciousness: awake and responds to simple questions  Post-anesthesia pain: pain controlled  Post-anesthesia nausea and vomiting: no  Pulmonary: unassisted, return to baseline  Cardiovascular: stable and blood pressure at baseline  Hydration: adequate  Anesthetic events: no    QCDR Measures:  ASA# 11 - Wendy-op Cardiac Arrest: ASA11B - Patient did NOT experience unanticipated cardiac arrest  ASA# 12 - Wendy-op Mortality Rate: ASA12B - Patient did NOT die  ASA# 13 - PACU Re-Intubation Rate: ASA13B - Patient did NOT require a new airway mgmt  ASA# 10 - Composite Anes Safety: ASA10A - No serious adverse event  ASA# 38 - New Corneal Injury: ASA38A - No new exposure keratitis or corneal abrasion in PACU    Additional Notes:

## 2021-06-09 NOTE — PROGRESS NOTES
Neptali Olivarez is status post laparoscopic appendectomy after perforation.  She recently underwent an abscessogram which did not demonstrate any fistula and the abscess cavity appeared smaller.  Her pain is controlled and she is tolerating regular diet.  EXAM:  /88 (Patient Site: Left Arm, Patient Position: Sitting, Cuff Size: Adult Regular)  Pulse (!) 105  LMP 03/03/2017  SpO2 97%  Breastfeeding? No  GENERAL: Well developed female, No acute distress, pleasant and conversant   EYES: Pupils equal, round and reactive, no scleral icterus  ABDOMEN: Soft, mild tender to the right lower quadrant with very little drainage from the CT guided right lower quadrant drain.  Left-sided TRUPTI drain was scant dark bloody fluid  SKIN: Pink, warm and dry, no obvious rashes or lesions   NEURO:No focal deficits, ambulatory  MUSCULOSKELETAL:No obvious deformities, no swelling, normal appearing    PROCEDURE: Abscess drain injection and exchange.  3/22/2017 1:59 PM     INDICATION: Follow-up right lower quadrant abscess. CT earlier today demonstrates significant improvement in the right lower quadrant abscess, significant residual fluid around the drain.  SEDATION: Versed 2 mg. Fentanyl 50 mcg. The procedure was performed with administration intravenous conscious sedation with appropriate preoperative, intraoperative, and postoperative evaluation.  15 minutes of supervised face to face conscious sedation time was provided by a radiology nurse under my direct supervision.  FLUOROSCOPIC TIME: 0.6 minutes.  IMAGES: 3.  CONTRAST: 10 mL Omnipaque 300.     PROCEDURES:  1. Exchange of right lower quadrant abscess drain, with fluoroscopic guidance.     STERILE BARRIER TECHNIQUE: Maximum sterile barrier technique was used. Cutaneous antisepsis was performed at the operative area with application of 2% chlorhexidine and large sterile drape. Prior to the procedure the surgeon and assistant performed hand   hygiene and wore hat, mask, sterile  gown, and sterile gloves during the entire procedure.     SUMMARY: After discussing the procedure and risks, informed consent was obtained.      The right lower quadrant drain was prepped and draped. Contrast injection confirmed position within a contained abscess. The existing drain was not draining well.     After local anesthesia, the existing drain was removed over a guidewire and replaced with a new 14 Pashto locking pigtail, positioning the pigtail with fluoroscopic guidance into the abscess cavity. The cavity was thoroughly irrigated with saline,   clearing significant debris. A spot image was obtained for documentation after contrast injection. No fistula is identified to any adjacent structure.     The new drain was secured and attached to a gravity bag.     IMPRESSION:   1. Uneventful exchange of right lower quadrant drain. No fistula identified to any adjacent structures    ASSESSMENT AND PLAN:  Neptali Olivarez is doing well.  Her abscess appears smaller cysts but she is having purulent drainage from her CT-guided drain.  She has a repeated abscessogram scheduled for next week.  It is likely that the drain will be pulled at that time.  I will have her follow up with me once that is complete and I will remove her remaining TRUPTI drain.    Refugio Lu D.O., FACS  [phone]  [facility] Department of Surgery

## 2021-06-09 NOTE — ANESTHESIA CARE TRANSFER NOTE
Last vitals:   Vitals:    03/05/17 0657   BP: (!) 154/91   Pulse: (!) 124   Resp: 20   Temp: 36.4  C (97.6  F)   SpO2: 100%     Patient's level of consciousness is drowsy  Spontaneous respirations: yes  Maintains airway independently: yes  Dentition unchanged: yes  Oropharynx: oropharynx clear of all foreign objects    QCDR Measures:  ASA# 20 - Surgical Safety Checklist: ASA20A - Safety Checks Done  PQRS# 430 - Adult PONV Prevention: 4558F-1P - Medical reason for NOT administering combination therapy  ASA# 8 - Peds PONV Prevention: NA - Not pediatric patient, not GA or 2 or more risk factors NOT present  PQRS# 424 - Wendy-op Temp Management: 4559F - At least one body temp DOCUMENTED => 35.5C or 95.9F within required timeframe  PQRS# 426 - PACU Transfer Protocol: - Transfer of care checklist used  ASA# 14 - Acute Post-op Pain: ASA14B - Patient did NOT experience pain >= 7 out of 10    I completed my SBAR handoff to the receiving nurse per policy and procedure.

## 2021-06-09 NOTE — PROGRESS NOTES
[facility] Radiology Pre-Procedure Note  Date/Time: 3/22/2017/12:27 PM    Requested Procedure:  abscessogram  Requested Provider:  Sweetie Liz CNP    HPI: Neptali Olivarez is a 41 y.o. female with history of DM who presented to ED 3/4/17 with RLQ abdominal pain. CT abdomen and pelvis confirms ruptured appendicitis with phlegmon formation. S/p Laparoscopic appendectomy 3/5/17. 3/9/17 repeat CT showed small fluid collection not accessible for percutaneous drainage. 3/12/17 CT showed fluid collection has increased in size and patient had CT guided 12 F locking drain placement into abscess (likely infected hematoma). TPA instilled into drain on 3/13 with good results, increased outputs on 3/14/17. Last abscessogram was performed on 3/15/17 and demonstrated a moderate to large residual complex fluid collection with debris seen in the right lower quadrant' the existing 12 Kyrgyz drainage catheter was successfully upsized to a 14 Kyrgyz pigtail drainage catheter. Patient returns today for routine CT and abscessogram. CT demonstrating the RLQ drain to be in good position with smaller size abscess. Patient reports some leakage, hasn't flushed for the past few days since she had run out of flushes and unable to fill her new prescription, and has about a half inch of drainage out daily from her RLQ drain.     IMAGING:    3/22/17 abd/pel CT:   IMPRESSION:   CONCLUSION:  1. Right lower quadrant drain in good position in complex abscess. The abscess is significantly smaller, but there is significant residual fluid around the drain.  2. Several small isolated fluid collections throughout the lower abdomen and pelvis are smaller than on the previous study.  3. Wedge-shaped region of hypodensity and decreased perfusion in the central spleen, suspicious for infarct, of uncertain etiology.    3/15/17 Last abscessogram:   FINDINGS:  Aspiration from the existing abscess catheter yielded reddish/brown purulent fluid and debris. Abscessogram by a  contrast injection through the catheter demonstrated a moderate to large sized complex fluid collection with debris. This was confirmed in   multiple obliquities. After exchange, repeat abscessogram was performed again demonstrating the fluid collection and appropriate placement of the new drainage catheter. At this point, 90 mL of reddish/brown purulent fluid and debris was aspirated from   the fluid collection.     IMPRESSION:   Moderate to large residual complex fluid collection with debris seen in the right lower quadrant. The existing 12 French drainage catheter was successfully upsized to a 14 French pigtail drainage catheter. 90 mL of reddish/brown purulent fluid and debris  was successfully aspirated from the fluid collection. The catheter is now attached to a gravity bag for drainage which should decrease the chance of spontaneous occlusion. Continue flushing with 10 mL of normal saline towards the patient 3 times a day.    NPO status:  midnight  Anticoagulation/Antiplatelets/Bleeding tendencies:  none  Antibiotics:  Flagyl PO and levaquin PO    PAST MEDICAL HISTORY:    History reviewed. No pertinent past medical history.    PAST SURGICAL HISTORY:  Past Surgical History:   Procedure Laterality Date     [place] LAP,APPENDECTOMY N/A 3/5/2017    Procedure: APPENDECTOMY, LAPAROSCOPIC;  Surgeon: Kenneth Lu DO;  Location: [facility] [place];  Service: General     VITRECTOMY Bilateral     pt reported       ALLERGIES:  Review of patient's allergies indicates no known allergies.    MEDICATIONS:  Current Outpatient Prescriptions   Medication Sig Dispense Refill     acetaminophen (TYLENOL) 325 MG tablet Take 3 tablets (975 mg total) by mouth every 8 (eight) hours as needed for pain. 30 tablet 0     ferrous sulfate 325 (65 FE) MG tablet Take 1 tablet by mouth daily.       ibuprofen (ADVIL,MOTRIN) 400 MG tablet Take 1 tablet (400 mg total) by mouth every 6 (six) hours as needed. 30 tablet 0     LANTUS 100 unit/mL  "injection Inject 24 Units under the skin bedtime. 11.9 Type 2 without complications 10 mL PRN     levoFLOXacin (LEVAQUIN) 500 MG tablet Take 1 tablet (500 mg total) by mouth Daily at 6:00 am. 28 tablet 0     metroNIDAZOLE (FLAGYL) 500 MG tablet Take 1 tablet (500 mg total) by mouth 2 (two) times a day. 56 tablet 0     NOVOLOG 100 unit/mL injection Check blood sugar four (4) times daily.  11.9 Type 2 without complications  Microlet Color Lancets (100s) - dispense 1, refill PRN for 1 year  Flex Pen Needles - BD Ultra-fine Latosha Pen Needles - NDC [address]-56 - dispense 1 case, refill PRN for 1 year 10 mL PRN     oxyCODONE (ROXICODONE) 5 mg/5 mL solution Take 10 mL (10 mg total) by mouth 4 (four) times a day as needed for pain. 280 mL 0     senna-docusate (PERICOLACE) 8.6-50 mg tablet Take 1 tablet by mouth 2 (two) times a day as needed for constipation. 14 tablet 0     Current Facility-Administered Medications   Medication Dose Route Frequency Provider Last Rate Last Dose     lidocaine 10 mg/mL (1 %) injection 0.1-0.3 mL  0.1-0.3 mL Subcutaneous PRN Ariana Castillo PA-C         sodium chloride 0.9 % flush 3 mL (NS)  3 mL Intravenous Line Care Ariana Castillo PA-C           LABS:    Lab Results   Component Value Date    INR 1.18 (H) 03/12/2017     Lab Results   Component Value Date    WBC 9.7 03/16/2017    HGB 9.4 (L) 03/16/2017    HCT 28.9 (L) 03/16/2017    MCV 77 (L) 03/16/2017     03/16/2017     Lab Results   Component Value Date    CREATININE 0.60 03/16/2017       EXAM:  Visit Vitals     /85     Pulse 90     Temp 98.2  F (36.8  C) (Oral)     Resp 16     Ht 5' 1\" (1.549 m)     Wt 149 lb (67.6 kg)     LMP 02/21/2017     SpO2 98%     BMI 28.15 kg/m2     General:  Stable.  In no acute distress.  Neuro:  A&O x 3.  Moves all extremities equally.  Resp:  Lungs clear to auscultation bilaterally.  Cardio:  S1S2 and reg, without murmur, clicks or rubs.  Abdomen:  Soft, hypoactive bowel " sounds. RLQ drain intact with brown drainage noted in bag. Left side surgical drain to TRUPTI bulb.     Pre-Sedation Assessment:  Mallampati Airway Classification: Class 1: entire tonsil clearly visble  Previous reaction to anesthesia/sedation: no  Sedation plan based on assessment: Moderate  Sleep Apnea: no  Dentures: Unk  COPD: no  ASA Classification: ASA 3 - Patient with moderate systemic disease with functional limitations  Comments: None     ASSESSMENT: 41 y.o female with ruptured appendicitis s/p laparoscopic appendectomy 3/5/17 and CT guided 12 F locking drain placement into abscess (likely infected hematoma) on 3/12/17; drain upsized to 14F on 3/15/17.     PLAN:  Abscessogram with sedation and possible reposition, exchange, and/or removal of tubing.     The procedure, risks and moderate sedation were discussed with patient, all questions answered and patient agrees to proceed with the procedure.   Written consent obtained.    Carmen Castillo CNP  Interventional Radiology

## 2021-06-09 NOTE — PROCEDURES
[facility] RADIOLOGY  IR    Proc: Drain exchange.  Meds: IV Versed and fentanyl.  Findings: Poorly functioning RLQ drain -- exchanged for new 14 Fr drain. No fistula.  Complications: None immediate.    F/U next week.

## 2021-06-09 NOTE — H&P
[facility] Radiology Interventional Radiology - History and Physical Update    H&P documented within 30 days (by Tino Powers DO on 3/4/17).  I have personally reviewed the patient's medical history and have updated the medical record as necessary.      Procedure Requested: abscessogram  Requesting Provider: Jonathan    HPI: Neptali Olivarez is a 41 y.o. old female who presented 3/4/17 with ruptured appendicitis with phlegmon formation, s/p laparoscopic appendectomy 3/5/17 with LLQ surgical TRUPTI drain placement. Repeat CT (3/9/17) demonstrated small fluid collection not accessible for percutaneous drainage. Follow up CT (3/12/17) demonstrated that fluid collection increased in size requiring CT guided 12 F locking drain placement into abscess (likely infected hematoma). TPA instilled into drain on 3/13 with good results, increased outputs on 3/14/17. Drain upsized to 14F 3/15/17. Presenting today for follow up.    Has been flushing RLQ drain with what sounds like 10cc NS daily.  Reports what sounds like 20cc NS daily. Pt reports no OP from surgical drain. Patient is following with Dr. Lu; last seen by him 3/24/17. Plan from that note indicates that Dr. Lu will be seeing her today with plans to pull the surgical drain if her abscess drain is removed today.    IMAGING:   Last abscessogram 3/22/17:  SUMMARY: After discussing the procedure and risks, informed consent was obtained.      The right lower quadrant drain was prepped and draped. Contrast injection confirmed position within a contained abscess. The existing drain was not draining well.     After local anesthesia, the existing drain was removed over a guidewire and replaced with a new 14 Telugu locking pigtail, positioning the pigtail with fluoroscopic guidance into the abscess cavity. The cavity was thoroughly irrigated with saline,   clearing significant debris. A spot image was obtained for documentation after contrast injection. No fistula is identified to  any adjacent structure.     The new drain was secured and attached to a gravity bag.     IMPRESSION:   1. Uneventful exchange of right lower quadrant drain. No fistula identified to any adjacent structures.SUMMARY: After discussing the procedure and risks, informed consent was obtained.      The right lower quadrant drain was prepped and draped. Contrast injection confirmed position within a contained abscess. The existing drain was not draining well.     After local anesthesia, the existing drain was removed over a guidewire and replaced with a new 14 Kenyan locking pigtail, positioning the pigtail with fluoroscopic guidance into the abscess cavity. The cavity was thoroughly irrigated with saline,   clearing significant debris. A spot image was obtained for documentation after contrast injection. No fistula is identified to any adjacent structure.     The new drain was secured and attached to a gravity bag.     IMPRESSION:   1. Uneventful exchange of right lower quadrant drain. No fistula identified to any adjacent structures.    NPO Status: midnight.  Anticoagulation/Antiplatelets/Bleeding tendencies: none  Antibiotics: levaquin. Flagyl.    REVIEW OF SYMPTOMS: Denies recent fevers, chills, night sweats, abdominal pain, N/V/D.    PAST MEDICAL HISTORY:   Past Medical History:   Diagnosis Date     Abscess of appendix     ruptured     Diabetes mellitus          PAST SURGICAL HISTORY:  Past Surgical History:   Procedure Laterality Date     ABCESS DRAINAGE       [place] LAP,APPENDECTOMY N/A 3/5/2017    Procedure: APPENDECTOMY, LAPAROSCOPIC;  Surgeon: Kenneth Lu DO;  Location: [facility] [place];  Service: General     VITRECTOMY Bilateral     pt reported       ALLERGIES:  Review of patient's allergies indicates no known allergies.    MEDICATIONS:  Current Outpatient Prescriptions   Medication Sig Dispense Refill     acetaminophen (TYLENOL) 325 MG tablet Take 3 tablets (975 mg total) by mouth every 8 (eight) hours as  needed for pain. 30 tablet 0     ferrous sulfate 325 (65 FE) MG tablet Take 1 tablet by mouth daily.       LANTUS 100 unit/mL injection Inject 24 Units under the skin bedtime. 11.9 Type 2 without complications 10 mL PRN     levoFLOXacin (LEVAQUIN) 500 MG tablet Take 1 tablet (500 mg total) by mouth Daily at 6:00 am. 28 tablet 0     metroNIDAZOLE (FLAGYL) 500 MG tablet Take 1 tablet (500 mg total) by mouth 2 (two) times a day. 56 tablet 0     NOVOLOG 100 unit/mL injection Check blood sugar four (4) times daily.  11.9 Type 2 without complications  Microlet Color Lancets (100s) - dispense 1, refill PRN for 1 year  Flex Pen Needles - BD Ultra-fine Latosha Pen Needles - NDC [address]-03 - dispense 1 case, refill PRN for 1 year 10 mL PRN     oxyCODONE (ROXICODONE) 5 mg/5 mL solution Take 10 mg by mouth every 4 (four) hours as needed for pain.       Current Facility-Administered Medications   Medication Dose Route Frequency Provider Last Rate Last Dose     lidocaine 10 mg/mL (1 %) injection 0.1-0.3 mL  0.1-0.3 mL Subcutaneous PRN Ariana Castillo PA-C         sodium chloride 0.9 % flush 3 mL (NS)  3 mL Intravenous Line Care Ariana Castillo PA-C           LABS:  None required    EXAM:  BP (!) 166/94  Pulse 96  Temp 98.9  F (37.2  C) (Oral)   Resp 18  Ht 5' (1.524 m)  Wt 135 lb (61.2 kg)  [facility] 03/03/2017  SpO2 98%  BMI 26.37 kg/m2  General: Stable. In no acute distress.  Neuro: A&O x 3. Moves all extremities equally.  Resp: Lungs clear to auscultation bilaterally.  Cardio: S1S2 and reg, without murmur, clicks or rubs  Abdomen: Soft, non-distended, non-tender. RLQ drain to gravity bag with yellow serous fluid in bag, site non tender to palpation. LLQ surgical drain to TRUPTI bulb with no OP in bulb.    Pre-Sedation Assessment:  Mallampati Airway Classification: Class 2: upper half of tonsil fossa visible  Previous reaction to anesthesia/sedation: no  Sedation plan based on assessment: Moderate  Sleep  Apnea: no  Dentures: no  COPD: no  ASA Classification: ASA 3 - Patient with moderate systemic disease with functional limitations  Comments: no hx of asthma.    ASSESSMENT: 40 yo female with post op fluid collection following laparoscopic appendectomy 3/5/17    PLAN: abscessogram with possible drain reposition, exchange or removal with sedation as needed.    The procedure, risks and moderate sedation were discussed with the patient, all questions answered and patient agrees to proceed with the procedure. Written consent obtained.    Ariana Castillo PA-C  Interventional Radiology

## 2021-06-09 NOTE — ANESTHESIA PREPROCEDURE EVALUATION
Anesthesia Evaluation        Airway   Mallampati: II   Pulmonary - normal exam                          Cardiovascular   Exercise tolerance: > or = 4 METS  Rhythm: regular  Rate: normal,         Neuro/Psych    (+) seizures,     Endo/Other    (+) diabetes mellitus type 2 well controlled,      GI/Hepatic/Renal       Other findings: Lactic acid 3.3  UPT -   Glucose 97 pre op      Dental    (+) upper dentures                       Anesthesia Plan  Planned anesthetic: general endotracheal  Soft bite block  Check glucose post op  ASA 2   Induction: intravenous   Anesthetic plan and risks discussed with: patient  Anesthesia plan special considerations: rapid sequence induction,   Post-op plan: routine recovery

## 2021-06-09 NOTE — PROGRESS NOTES
Neptali Olivarez is status post laparoscopic appendectomy.  She presents today for TRUPTI drain removal.  Her other pigtail catheter were was removed today after her up CT scan was negative  EXAM:  /87 (Patient Site: Right Arm, Patient Position: Sitting, Cuff Size: Adult Regular)  Pulse 97  LMP 03/03/2017  SpO2 98%  Breastfeeding? No  GENERAL: Well developed female, No acute distress, pleasant and conversant   EYES: Pupils equal, round and reactive, no scleral icterus  ABDOMEN: Soft, nontender, TRUPTI drain in place with scant yellowish output  SKIN: Pink, warm and dry, no obvious rashes or lesions   NEURO:No focal deficits, ambulatory  MUSCULOSKELETAL:No obvious deformities, no swelling, normal appearing       [facility]     PROCEDURE: ABSCESS TUBE CHECK     ATTENDING: Sebastian Mayen MD.     INDICATION: 41-year-old female with a right lower quadrant abscess status post percutaneous drainage. The patient reports scant output and has been afebrile.     MODERATE SEDATION: None.     ANTIBIOTICS: None.     ADDITIONAL MEDICATIONS: None.     FLUOROSCOPIC TIME: 0.9 minutes.     AIR KERMA: 37 mGy.     CONTRAST: 10 mL Omnipaque into the abscess cavity.     COMPLICATIONS: No immediate complications.     PROCEDURE:   Multiprojectional  images were obtained. The previous drainage catheter was injected with a small amount of contrast, and multiple images were obtained. Based on the results of the study, the drainage catheter was removed over a wire.     FINDINGS:  The  image demonstrates the 14 Latvian right lower quadrant abscess drainage catheter. An injection of contrast shows no residual abscess cavity or fistulous communication. The drainage catheter was removed.     IMPRESSION:   No residual abscess cavity or fistulous communication. The drainage catheter was removed.       ASSESSMENT AND PLAN:  Neptali Olivarez is doing well postoperatively.  I have removed her last TRUPTI drain.  She may resume work on Monday and may  now follow up with me on a when necessary basis.    Refugio Lu D.O. [facility]  [phone]  [facility] Department of Surgery

## 2021-06-15 PROBLEM — K35.30 ACUTE APPENDICITIS WITH LOCALIZED PERITONITIS: Status: ACTIVE | Noted: 2017-03-04

## 2023-12-04 ENCOUNTER — TRANSFERRED RECORDS (OUTPATIENT)
Dept: HEALTH INFORMATION MANAGEMENT | Facility: CLINIC | Age: 48
End: 2023-12-04
Payer: COMMERCIAL

## 2023-12-04 LAB
ALT SERPL-CCNC: 22 U/L (ref 5–30)
AST SERPL-CCNC: 11 U/L (ref 7–31)
CHOLESTEROL (EXTERNAL): 201 MG/DL (ref 6–200)
CREATININE (EXTERNAL): 0.97 MG/DL (ref 0.6–1.1)
GFR ESTIMATED (EXTERNAL): >60 ML/MIN
GFR ESTIMATED (IF AFRICAN AMERICAN) (EXTERNAL): >60 ML/MIN
GLUCOSE (EXTERNAL): 397 MG/DL (ref 70–105)
HBA1C MFR BLD: 11.3 %
HDLC SERPL-MCNC: 38 MG/DL (ref 40–60)
POTASSIUM (EXTERNAL): 3.66 MMOL/L (ref 3.5–5.1)
TRIGLYCERIDES (EXTERNAL): 490 MG/DL (ref 16–150)

## 2023-12-12 ENCOUNTER — TELEPHONE (OUTPATIENT)
Dept: OBGYN | Facility: CLINIC | Age: 48
End: 2023-12-12
Payer: COMMERCIAL

## 2024-01-12 ENCOUNTER — OFFICE VISIT (OUTPATIENT)
Dept: OBGYN | Facility: CLINIC | Age: 49
End: 2024-01-12
Payer: COMMERCIAL

## 2024-01-12 VITALS — WEIGHT: 148.5 LBS | SYSTOLIC BLOOD PRESSURE: 138 MMHG | BODY MASS INDEX: 29 KG/M2 | DIASTOLIC BLOOD PRESSURE: 80 MMHG

## 2024-01-12 DIAGNOSIS — N93.9 ABNORMAL UTERINE BLEEDING (AUB): Primary | ICD-10-CM

## 2024-01-12 PROCEDURE — 99203 OFFICE O/P NEW LOW 30 MIN: CPT | Performed by: STUDENT IN AN ORGANIZED HEALTH CARE EDUCATION/TRAINING PROGRAM

## 2024-01-12 RX ORDER — METOPROLOL SUCCINATE 25 MG/1
2 TABLET, EXTENDED RELEASE ORAL DAILY
COMMUNITY
Start: 2023-05-19

## 2024-01-12 RX ORDER — LIRAGLUTIDE 6 MG/ML
1.8 INJECTION SUBCUTANEOUS
COMMUNITY
Start: 2022-06-06

## 2024-01-12 RX ORDER — LEVOTHYROXINE SODIUM 50 UG/1
50 TABLET ORAL
COMMUNITY
Start: 2023-05-19

## 2024-01-12 RX ORDER — ATORVASTATIN CALCIUM 80 MG/1
1 TABLET, FILM COATED ORAL AT BEDTIME
COMMUNITY
Start: 2023-05-19

## 2024-01-12 RX ORDER — FUROSEMIDE 40 MG
TABLET ORAL
COMMUNITY
Start: 2023-09-28

## 2024-01-12 RX ORDER — LISINOPRIL 5 MG/1
1 TABLET ORAL DAILY
COMMUNITY
Start: 2023-05-19

## 2024-01-12 RX ORDER — SEMAGLUTIDE 1.34 MG/ML
0.5 INJECTION, SOLUTION SUBCUTANEOUS
COMMUNITY

## 2024-01-12 NOTE — PROGRESS NOTES
[facility]  Gynecology Office Visit    CC: Heavy menses    S:  Neptali Olivarez is a 48 year old  w/ PMH of AUB with ABLA s/p ablation, CAD s/p CABG, HTN, uncontrolled T2DM, HLD, hypothyroidism, emphysema who referred for heavy menses from her PCP.    - Seen at [facility] 23 complained of heavy bleeding and fatigue. Hgb found to be 7.5. Did not go to work b/c thoht she might pass out. Last period was 5 days, very heavy.  - She was then sent to the ED and had US that showed submucosal fibroid. She had a GYN consult and was started on Megace and was told to follow-up with GYN for next steps. She was also scheduled for and received IV Fe infusions.    - Has not been seen in [facility] system since . She had a D&C at that time with benign results also for AUB.   - She thought today's office visit was with [facility]. She would like to go to [facility] for her GYN care.    - She has not started Megace yet, was told it would might interfere with her cardiac meds.   - Has had 2 periods in December both very heavy.    O:  /80 (BP Location: Left arm, Cuff Size: Adult Regular)   Wt 67.4 kg (148 lb 8 oz)   LMP 2023 (Approximate)   BMI 29.00 kg/m      Exam:  GEN: Appears well, NAD    A/P:  Neptali Olivarez is a 48 year old  here for AUB.   She thought this visit was with [facility] and wants to get care there which makes sense because that is where all of her care has been the last few years.     #AUB  - Follow-up with OB/GYN at [facility]  - Encouraged to take Megace to control her bleeding. Reviewed her medications and no interactions seen.    Chacho Kan MD

## 2024-01-12 NOTE — NURSING NOTE
Chief Complaint   Patient presents with    Consult     Heavy menses   U/S  Fibroid   Ablation  IUD  fell out        Initial /80 (BP Location: Left arm, Cuff Size: Adult Regular)   Wt 67.4 kg (148 lb 8 oz)   LMP 12/22/2023 (Approximate)   BMI 29.00 kg/m   Estimated body mass index is 29 kg/m  as calculated from the following:    Height as of 8/25/17: 1.524 m (5').    Weight as of this encounter: 67.4 kg (148 lb 8 oz).  BP completed using cuff size: regular    Questioned patient about current smoking habits.  Pt. has never smoked.    G10 P 6     The following HM Due: NONE        Brigette Godinez, CMA on 1/12/2024 at 9:17 AM

## (undated) DEVICE — PAD CHUX UNDERPAD 30X36" P3036C

## (undated) DEVICE — GLOVE PROTEXIS W/NEU-THERA 7.0  2D73TE70

## (undated) DEVICE — LINEN HALF SHEET 5512

## (undated) DEVICE — SOL NACL 0.9% IRRIG 1000ML BOTTLE 2F7124

## (undated) DEVICE — PACK MINOR LITHOTOMY RIDGES

## (undated) DEVICE — DRSG TELFA 2X3"

## (undated) DEVICE — SYR 50ML LL W/O NDL 309653

## (undated) DEVICE — LINEN DRAPE 54X72" 5467

## (undated) DEVICE — CATH INTERMITTENT CLEAN-CATH FEMALE 14FR 6" VINYL LF 420614

## (undated) DEVICE — BAG CLEAR TRASH 1.3M 39X33" P4040C

## (undated) DEVICE — LINEN FULL SHEET 5511

## (undated) RX ORDER — OXYCODONE HCL 5 MG/5 ML
SOLUTION, ORAL ORAL
Status: DISPENSED
Start: 2017-08-25

## (undated) RX ORDER — KETOROLAC TROMETHAMINE 30 MG/ML
INJECTION, SOLUTION INTRAMUSCULAR; INTRAVENOUS
Status: DISPENSED
Start: 2017-08-25

## (undated) RX ORDER — ONDANSETRON 2 MG/ML
INJECTION INTRAMUSCULAR; INTRAVENOUS
Status: DISPENSED
Start: 2017-08-25

## (undated) RX ORDER — PROPOFOL 10 MG/ML
INJECTION, EMULSION INTRAVENOUS
Status: DISPENSED
Start: 2017-08-25

## (undated) RX ORDER — FENTANYL CITRATE 50 UG/ML
INJECTION, SOLUTION INTRAMUSCULAR; INTRAVENOUS
Status: DISPENSED
Start: 2017-08-25

## (undated) RX ORDER — GLYCOPYRROLATE 0.2 MG/ML
INJECTION INTRAMUSCULAR; INTRAVENOUS
Status: DISPENSED
Start: 2017-08-25

## (undated) RX ORDER — LIDOCAINE HYDROCHLORIDE 10 MG/ML
INJECTION, SOLUTION EPIDURAL; INFILTRATION; INTRACAUDAL; PERINEURAL
Status: DISPENSED
Start: 2017-08-25

## (undated) RX ORDER — DEXAMETHASONE SODIUM PHOSPHATE 4 MG/ML
INJECTION, SOLUTION INTRA-ARTICULAR; INTRALESIONAL; INTRAMUSCULAR; INTRAVENOUS; SOFT TISSUE
Status: DISPENSED
Start: 2017-08-25

## (undated) RX ORDER — CEFAZOLIN SODIUM 2 G/100ML
INJECTION, SOLUTION INTRAVENOUS
Status: DISPENSED
Start: 2017-08-25